# Patient Record
Sex: MALE | Race: WHITE | NOT HISPANIC OR LATINO | URBAN - METROPOLITAN AREA
[De-identification: names, ages, dates, MRNs, and addresses within clinical notes are randomized per-mention and may not be internally consistent; named-entity substitution may affect disease eponyms.]

---

## 2017-04-03 ENCOUNTER — EMERGENCY (EMERGENCY)
Facility: HOSPITAL | Age: 37
LOS: 1 days | Discharge: HOME | End: 2017-04-03
Attending: EMERGENCY MEDICINE | Admitting: EMERGENCY MEDICINE
Payer: COMMERCIAL

## 2017-04-03 VITALS
DIASTOLIC BLOOD PRESSURE: 83 MMHG | WEIGHT: 199.96 LBS | TEMPERATURE: 98 F | HEART RATE: 73 BPM | SYSTOLIC BLOOD PRESSURE: 131 MMHG | RESPIRATION RATE: 18 BRPM | HEIGHT: 75 IN | OXYGEN SATURATION: 97 %

## 2017-04-03 DIAGNOSIS — H53.9 UNSPECIFIED VISUAL DISTURBANCE: ICD-10-CM

## 2017-04-03 DIAGNOSIS — H57.12 OCULAR PAIN, LEFT EYE: ICD-10-CM

## 2017-04-03 PROCEDURE — 99284 EMERGENCY DEPT VISIT MOD MDM: CPT

## 2017-04-03 PROCEDURE — 70450 CT HEAD/BRAIN W/O DYE: CPT | Mod: 26

## 2017-04-03 NOTE — ED PROVIDER NOTE - MEDICAL DECISION MAKING DETAILS
CT negative, sx's resolved. Strict F/U with Ophthalmology within 24 hours discussed which pt agrees to. Strict return precautions reviewed with pt in which pt verbalizes understanding and agrees to.

## 2017-04-03 NOTE — ED PROVIDER NOTE - OBJECTIVE STATEMENT
35 y/o M with no known sig PMHx presents c/o left eye vision changes that began at ~19:30pm and lasted for approximately 20-30 minutes before resolving. States he began to develop a non specific visual disturbance of the left eye with lots of "floaters in the periphery". Sx's were constant with no aggravating/alleviating factors. Vision changes have since resolved and states that now his eye just feels slightly sore. States that his left eye vision might be slightly more blurry than the right eye but he is not sure. No hx of similar episodes in the past.    Denies fever, chills, headache, dizziness, confusion, LOC, neck pain or stiffness, diplopia, photophobia, ocular injury or discharge, tinnitus or hearing loss, dysarthria, CP, SOB, palpitations, N/V

## 2017-04-03 NOTE — ED ADULT TRIAGE NOTE - CHIEF COMPLAINT QUOTE
Patient to ED with complaint of seeing "thousands of floaties" 45 minutes PTA and now believes that his vision has darkened in left eye with soreness.  Patient in no distress

## 2023-11-13 NOTE — ED ADULT NURSE NOTE - NS ED PATIENT SAFETY CONCERN
Rx Refill Note  Requested Prescriptions     Pending Prescriptions Disp Refills    lisinopril (PRINIVIL,ZESTRIL) 2.5 MG tablet [Pharmacy Med Name: Lisinopril 2.5 MG Oral Tablet] 90 tablet 3     Sig: TAKE 1 TABLET BY MOUTH DAILY      Last office visit with prescribing clinician: 5/1/2023   Last telemedicine visit with prescribing clinician: Visit date not found   Next office visit with prescribing clinician: Visit date not found                         Would you like a call back once the refill request has been completed: [] Yes [] No    If the office needs to give you a call back, can they leave a voicemail: [] Yes [] No    Mila Bullock  11/13/23, 08:17 EST    No

## 2024-01-24 NOTE — ED ADULT NURSE NOTE - PATIENT PROVIDED WITH THE SEVEN POINTS OF INFORMATION ABOUT HIV REQUIRED BY THE PUBLIC HEALTH LAW
Statement Selected Render Risk Assessment In Note?: no Additional Notes: Discussed biopsy if patient prefers with no medicine Detail Level: Simple